# Patient Record
Sex: MALE | Race: WHITE | NOT HISPANIC OR LATINO
[De-identification: names, ages, dates, MRNs, and addresses within clinical notes are randomized per-mention and may not be internally consistent; named-entity substitution may affect disease eponyms.]

---

## 2017-08-18 ENCOUNTER — MEDICATION RENEWAL (OUTPATIENT)
Age: 67
End: 2017-08-18

## 2017-10-26 ENCOUNTER — APPOINTMENT (OUTPATIENT)
Dept: UROLOGY | Facility: CLINIC | Age: 67
End: 2017-10-26
Payer: MEDICARE

## 2017-10-26 LAB
APPEARANCE: CLEAR
BACTERIA: ABNORMAL
BILIRUBIN URINE: NEGATIVE
BLOOD URINE: NEGATIVE
COLOR: YELLOW
GLUCOSE QUALITATIVE U: NEGATIVE MG/DL
HYALINE CASTS: 3 /LPF
KETONES URINE: NEGATIVE
LEUKOCYTE ESTERASE URINE: ABNORMAL
MICROSCOPIC-UA: NORMAL
NITRITE URINE: POSITIVE
PH URINE: 5
PROTEIN URINE: NEGATIVE MG/DL
RED BLOOD CELLS URINE: 2 /HPF
SPECIFIC GRAVITY URINE: 1.02
SQUAMOUS EPITHELIAL CELLS: 2 /HPF
UROBILINOGEN URINE: NEGATIVE MG/DL
WHITE BLOOD CELLS URINE: 22 /HPF

## 2017-10-26 PROCEDURE — 99214 OFFICE O/P EST MOD 30 MIN: CPT

## 2017-10-27 LAB
CORE LAB FLUID CYTOLOGY: NORMAL
PSA FREE FLD-MCNC: 19.5
PSA FREE SERPL-MCNC: 0.69 NG/ML
PSA SERPL-MCNC: 3.54 NG/ML

## 2017-10-30 LAB — BACTERIA UR CULT: ABNORMAL

## 2017-10-30 RX ORDER — NITROFURANTOIN (MONOHYDRATE/MACROCRYSTALS) 25; 75 MG/1; MG/1
100 CAPSULE ORAL TWICE DAILY
Qty: 14 | Refills: 0 | Status: ACTIVE | COMMUNITY
Start: 2017-10-30 | End: 1900-01-01

## 2018-02-13 LAB
APPEARANCE: CLEAR
BACTERIA: NEGATIVE
BILIRUBIN URINE: NEGATIVE
BLOOD URINE: NEGATIVE
COLOR: YELLOW
GLUCOSE QUALITATIVE U: NEGATIVE MG/DL
KETONES URINE: NEGATIVE
LEUKOCYTE ESTERASE URINE: ABNORMAL
MICROSCOPIC-UA: NORMAL
NITRITE URINE: NEGATIVE
PH URINE: 6
PROTEIN URINE: NEGATIVE MG/DL
RED BLOOD CELLS URINE: 2 /HPF
SPECIFIC GRAVITY URINE: 1
SQUAMOUS EPITHELIAL CELLS: 1 /HPF
UROBILINOGEN URINE: NEGATIVE MG/DL
WHITE BLOOD CELLS URINE: 3 /HPF

## 2018-02-14 LAB — BACTERIA UR CULT: NORMAL

## 2018-06-12 RX ORDER — CEPHALEXIN 500 MG/1
500 CAPSULE ORAL
Qty: 30 | Refills: 3 | Status: ACTIVE | COMMUNITY
Start: 2018-06-12 | End: 1900-01-01

## 2018-07-27 ENCOUNTER — APPOINTMENT (OUTPATIENT)
Dept: CT IMAGING | Facility: IMAGING CENTER | Age: 68
End: 2018-07-27
Payer: MEDICARE

## 2018-07-27 ENCOUNTER — OUTPATIENT (OUTPATIENT)
Dept: OUTPATIENT SERVICES | Facility: HOSPITAL | Age: 68
LOS: 1 days | End: 2018-07-27
Payer: MEDICARE

## 2018-07-27 DIAGNOSIS — Z00.8 ENCOUNTER FOR OTHER GENERAL EXAMINATION: ICD-10-CM

## 2018-07-27 DIAGNOSIS — N41.1 CHRONIC PROSTATITIS: ICD-10-CM

## 2018-07-28 LAB
APPEARANCE: ABNORMAL
BACTERIA: ABNORMAL
BILIRUBIN URINE: NEGATIVE
BLOOD URINE: ABNORMAL
COLOR: YELLOW
GLUCOSE QUALITATIVE U: NEGATIVE MG/DL
HYALINE CASTS: 0 /LPF
KETONES URINE: NEGATIVE
LEUKOCYTE ESTERASE URINE: ABNORMAL
MICROSCOPIC-UA: NORMAL
NITRITE URINE: POSITIVE
PH URINE: 5.5
PROTEIN URINE: ABNORMAL MG/DL
RED BLOOD CELLS URINE: 3 /HPF
SPECIFIC GRAVITY URINE: 1.02
SQUAMOUS EPITHELIAL CELLS: 1 /HPF
UROBILINOGEN URINE: NEGATIVE MG/DL
WHITE BLOOD CELLS URINE: >720 /HPF

## 2018-07-30 LAB
BACTERIA UR CULT: ABNORMAL
CORE LAB FLUID CYTOLOGY: NORMAL

## 2018-07-30 PROCEDURE — 74178 CT ABD&PLV WO CNTR FLWD CNTR: CPT

## 2018-07-30 PROCEDURE — 82565 ASSAY OF CREATININE: CPT

## 2018-07-30 PROCEDURE — 74178 CT ABD&PLV WO CNTR FLWD CNTR: CPT | Mod: 26

## 2018-07-30 RX ORDER — LEVOFLOXACIN 500 MG/1
500 TABLET, FILM COATED ORAL
Qty: 14 | Refills: 0 | Status: ACTIVE | COMMUNITY
Start: 2018-07-30 | End: 1900-01-01

## 2018-08-23 ENCOUNTER — APPOINTMENT (OUTPATIENT)
Dept: UROLOGY | Facility: CLINIC | Age: 68
End: 2018-08-23
Payer: MEDICARE

## 2018-08-23 DIAGNOSIS — R97.20 ELEVATED PROSTATE, SPECIFIC ANTIGEN [PSA]: ICD-10-CM

## 2018-08-23 PROCEDURE — 51798 US URINE CAPACITY MEASURE: CPT

## 2018-08-23 PROCEDURE — 99214 OFFICE O/P EST MOD 30 MIN: CPT | Mod: 25

## 2018-08-24 LAB
APPEARANCE: ABNORMAL
BACTERIA: ABNORMAL
BILIRUBIN URINE: NEGATIVE
BLOOD URINE: ABNORMAL
COLOR: YELLOW
GLUCOSE QUALITATIVE U: NEGATIVE MG/DL
HYALINE CASTS: 0 /LPF
KETONES URINE: NEGATIVE
LEUKOCYTE ESTERASE URINE: ABNORMAL
MICROSCOPIC-UA: NORMAL
NITRITE URINE: NEGATIVE
PH URINE: 5.5
PROTEIN URINE: NEGATIVE MG/DL
PSA FREE FLD-MCNC: 22.7
PSA FREE SERPL-MCNC: 0.92 NG/ML
PSA SERPL-MCNC: 4.06 NG/ML
RED BLOOD CELLS URINE: 2 /HPF
SPECIFIC GRAVITY URINE: 1.02
SQUAMOUS EPITHELIAL CELLS: 1 /HPF
UROBILINOGEN URINE: NEGATIVE MG/DL
WHITE BLOOD CELLS URINE: 176 /HPF

## 2018-08-27 LAB
BACTERIA UR CULT: ABNORMAL
CORE LAB FLUID CYTOLOGY: NORMAL

## 2018-09-03 PROBLEM — R97.20 ELEVATED PROSTATE SPECIFIC ANTIGEN (PSA): Status: ACTIVE | Noted: 2018-08-23

## 2018-10-25 ENCOUNTER — APPOINTMENT (OUTPATIENT)
Dept: UROLOGY | Facility: CLINIC | Age: 68
End: 2018-10-25

## 2019-04-16 ENCOUNTER — APPOINTMENT (OUTPATIENT)
Dept: UROLOGY | Facility: CLINIC | Age: 69
End: 2019-04-16
Payer: MEDICARE

## 2019-04-16 DIAGNOSIS — R97.20 ELEVATED PROSTATE, SPECIFIC ANTIGEN [PSA]: ICD-10-CM

## 2019-04-16 PROCEDURE — 51798 US URINE CAPACITY MEASURE: CPT

## 2019-04-16 PROCEDURE — 99214 OFFICE O/P EST MOD 30 MIN: CPT | Mod: 25

## 2019-04-16 PROCEDURE — 88112 CYTOPATH CELL ENHANCE TECH: CPT | Mod: 26

## 2019-04-16 NOTE — PHYSICAL EXAM
[General Appearance - Well Developed] : well developed [General Appearance - Well Nourished] : well nourished [General Appearance - In No Acute Distress] : no acute distress [Normal Appearance] : normal appearance [Well Groomed] : well groomed [Abdomen Tenderness] : non-tender [Abdomen Soft] : soft [Costovertebral Angle Tenderness] : no ~M costovertebral angle tenderness [Scrotum] : the scrotum was normal [Urinary Bladder Findings] : the bladder was normal on palpation [Urethral Meatus] : meatus normal [Testes Mass (___cm)] : there were no testicular masses [No Prostate Nodules] : no prostate nodules [Respiration, Rhythm And Depth] : normal respiratory rhythm and effort [] : no respiratory distress [Edema] : no peripheral edema [Oriented To Time, Place, And Person] : oriented to person, place, and time [Affect] : the affect was normal [Exaggerated Use Of Accessory Muscles For Inspiration] : no accessory muscle use [Mood] : the mood was normal [Not Anxious] : not anxious [No Palpable Adenopathy] : no palpable adenopathy [No Focal Deficits] : no focal deficits [Normal Station and Gait] : the gait and station were normal for the patient's age

## 2019-04-17 LAB
APPEARANCE: ABNORMAL
BACTERIA: ABNORMAL
BILIRUBIN URINE: NEGATIVE
BLOOD URINE: NEGATIVE
COLOR: YELLOW
GLUCOSE QUALITATIVE U: NEGATIVE
HYALINE CASTS: 2 /LPF
KETONES URINE: NEGATIVE
LEUKOCYTE ESTERASE URINE: ABNORMAL
MICROSCOPIC-UA: NORMAL
NITRITE URINE: POSITIVE
PH URINE: 6.5
PROTEIN URINE: NORMAL
PSA FREE FLD-MCNC: 19 %
PSA FREE SERPL-MCNC: 0.65 NG/ML
PSA SERPL-MCNC: 3.34 NG/ML
RED BLOOD CELLS URINE: 1 /HPF
SPECIFIC GRAVITY URINE: 1.02
SQUAMOUS EPITHELIAL CELLS: 3 /HPF
URINE CYTOLOGY: NORMAL
UROBILINOGEN URINE: NORMAL
WHITE BLOOD CELLS URINE: 195 /HPF

## 2019-04-18 LAB — BACTERIA UR CULT: ABNORMAL

## 2019-08-27 ENCOUNTER — MEDICATION RENEWAL (OUTPATIENT)
Age: 69
End: 2019-08-27

## 2019-09-05 ENCOUNTER — APPOINTMENT (OUTPATIENT)
Dept: UROLOGY | Facility: CLINIC | Age: 69
End: 2019-09-05

## 2019-11-05 ENCOUNTER — APPOINTMENT (OUTPATIENT)
Dept: UROLOGY | Facility: CLINIC | Age: 69
End: 2019-11-05

## 2020-05-27 ENCOUNTER — APPOINTMENT (OUTPATIENT)
Dept: UROLOGY | Facility: CLINIC | Age: 70
End: 2020-05-27
Payer: MEDICARE

## 2020-05-27 LAB
PSA FREE FLD-MCNC: 21 %
PSA FREE SERPL-MCNC: 0.81 NG/ML
PSA SERPL-MCNC: 3.75 NG/ML

## 2020-05-27 PROCEDURE — 99213 OFFICE O/P EST LOW 20 MIN: CPT

## 2020-05-28 LAB
APPEARANCE: ABNORMAL
BACTERIA: ABNORMAL
BILIRUBIN URINE: NEGATIVE
BLOOD URINE: NEGATIVE
COLOR: YELLOW
GLUCOSE QUALITATIVE U: NEGATIVE
HYALINE CASTS: 1 /LPF
KETONES URINE: NEGATIVE
LEUKOCYTE ESTERASE URINE: ABNORMAL
MICROSCOPIC-UA: NORMAL
NITRITE URINE: POSITIVE
PH URINE: 6.5
PROTEIN URINE: NORMAL
RED BLOOD CELLS URINE: 1 /HPF
SPECIFIC GRAVITY URINE: 1.03
SQUAMOUS EPITHELIAL CELLS: 4 /HPF
UROBILINOGEN URINE: NORMAL
WHITE BLOOD CELLS URINE: 13 /HPF

## 2020-05-30 LAB — BACTERIA UR CULT: ABNORMAL

## 2020-09-11 RX ORDER — ALFUZOSIN HYDROCHLORIDE 10 MG/1
10 TABLET, EXTENDED RELEASE ORAL
Qty: 90 | Refills: 3 | Status: ACTIVE | COMMUNITY
Start: 2020-09-11 | End: 1900-01-01

## 2021-05-20 ENCOUNTER — APPOINTMENT (OUTPATIENT)
Dept: UROLOGY | Facility: CLINIC | Age: 71
End: 2021-05-20
Payer: MEDICARE

## 2021-05-20 LAB
APPEARANCE: CLEAR
BACTERIA: ABNORMAL
BILIRUBIN URINE: NEGATIVE
BLOOD URINE: NORMAL
COLOR: YELLOW
GLUCOSE QUALITATIVE U: NEGATIVE
HYALINE CASTS: 2 /LPF
KETONES URINE: NEGATIVE
LEUKOCYTE ESTERASE URINE: ABNORMAL
MICROSCOPIC-UA: NORMAL
NITRITE URINE: POSITIVE
PH URINE: 5.5
PROTEIN URINE: NORMAL
RED BLOOD CELLS URINE: 12 /HPF
SPECIFIC GRAVITY URINE: 1.02
SQUAMOUS EPITHELIAL CELLS: 2 /HPF
UROBILINOGEN URINE: NORMAL
WHITE BLOOD CELLS URINE: 38 /HPF

## 2021-05-20 PROCEDURE — 99214 OFFICE O/P EST MOD 30 MIN: CPT

## 2021-05-21 LAB
PSA FREE FLD-MCNC: 25 %
PSA FREE SERPL-MCNC: 0.75 NG/ML
PSA SERPL-MCNC: 2.93 NG/ML

## 2021-05-23 LAB — BACTERIA UR CULT: ABNORMAL

## 2022-05-19 ENCOUNTER — APPOINTMENT (OUTPATIENT)
Dept: UROLOGY | Facility: CLINIC | Age: 72
End: 2022-05-19
Payer: MEDICARE

## 2022-05-19 DIAGNOSIS — N41.9 INFLAMMATORY DISEASE OF PROSTATE, UNSPECIFIED: ICD-10-CM

## 2022-05-19 LAB
PSA FREE FLD-MCNC: 20 %
PSA FREE SERPL-MCNC: 0.92 NG/ML
PSA SERPL-MCNC: 4.54 NG/ML

## 2022-05-19 PROCEDURE — 99214 OFFICE O/P EST MOD 30 MIN: CPT

## 2022-05-20 LAB
APPEARANCE: ABNORMAL
BACTERIA: ABNORMAL
BILIRUBIN URINE: NEGATIVE
BLOOD URINE: NEGATIVE
COLOR: YELLOW
GLUCOSE QUALITATIVE U: NEGATIVE
HYALINE CASTS: 4 /LPF
KETONES URINE: NEGATIVE
LEUKOCYTE ESTERASE URINE: ABNORMAL
MICROSCOPIC-UA: NORMAL
NITRITE URINE: POSITIVE
PH URINE: 6
PROTEIN URINE: NORMAL
RED BLOOD CELLS URINE: 5 /HPF
SPECIFIC GRAVITY URINE: 1.02
SQUAMOUS EPITHELIAL CELLS: 2 /HPF
UROBILINOGEN URINE: NORMAL
WHITE BLOOD CELLS URINE: 45 /HPF

## 2022-05-21 LAB — BACTERIA UR CULT: NORMAL

## 2023-05-18 ENCOUNTER — APPOINTMENT (OUTPATIENT)
Dept: UROLOGY | Facility: CLINIC | Age: 73
End: 2023-05-18
Payer: MEDICARE

## 2023-05-18 VITALS
RESPIRATION RATE: 17 BRPM | SYSTOLIC BLOOD PRESSURE: 147 MMHG | BODY MASS INDEX: 24.25 KG/M2 | HEIGHT: 68 IN | DIASTOLIC BLOOD PRESSURE: 78 MMHG | HEART RATE: 61 BPM | WEIGHT: 160 LBS | OXYGEN SATURATION: 97 %

## 2023-05-18 DIAGNOSIS — R35.0 FREQUENCY OF MICTURITION: ICD-10-CM

## 2023-05-18 PROCEDURE — 99214 OFFICE O/P EST MOD 30 MIN: CPT

## 2023-05-18 PROCEDURE — 88112 CYTOPATH CELL ENHANCE TECH: CPT | Mod: 26

## 2023-05-19 LAB
APPEARANCE: ABNORMAL
BACTERIA: ABNORMAL /HPF
BILIRUBIN URINE: NEGATIVE
BLOOD URINE: NEGATIVE
CALCIUM OXALATE CRYSTALS: PRESENT
CAST: 0 /LPF
COLOR: YELLOW
EPITHELIAL CELLS: 15 /HPF
GLUCOSE QUALITATIVE U: NEGATIVE MG/DL
KETONES URINE: NEGATIVE MG/DL
LEUKOCYTE ESTERASE URINE: ABNORMAL
MICROSCOPIC-UA: NORMAL
NITRITE URINE: POSITIVE
PH URINE: 5.5
PROTEIN URINE: NEGATIVE MG/DL
PSA FREE FLD-MCNC: 17 %
PSA FREE SERPL-MCNC: 1.03 NG/ML
PSA SERPL-MCNC: 6.22 NG/ML
RED BLOOD CELLS URINE: 3 /HPF
REVIEW: NORMAL
SPECIFIC GRAVITY URINE: 1.02
UROBILINOGEN URINE: 0.2 MG/DL
WHITE BLOOD CELLS URINE: 74 /HPF

## 2023-05-19 RX ORDER — CEFUROXIME AXETIL 500 MG/1
500 TABLET ORAL
Qty: 20 | Refills: 0 | Status: ACTIVE | COMMUNITY
Start: 2023-05-19 | End: 1900-01-01

## 2023-05-21 LAB — BACTERIA UR CULT: ABNORMAL

## 2023-05-22 LAB — URINE CYTOLOGY: NORMAL

## 2023-06-02 LAB
PSA FREE FLD-MCNC: 19 %
PSA FREE SERPL-MCNC: 0.75 NG/ML
PSA SERPL-MCNC: 3.98 NG/ML

## 2023-06-04 ENCOUNTER — TRANSCRIPTION ENCOUNTER (OUTPATIENT)
Age: 73
End: 2023-06-04

## 2023-06-08 RX ORDER — ALFUZOSIN HYDROCHLORIDE 10 MG/1
10 TABLET, EXTENDED RELEASE ORAL
Qty: 90 | Refills: 3 | Status: ACTIVE | COMMUNITY
Start: 2021-09-14 | End: 1900-01-01

## 2024-05-23 ENCOUNTER — APPOINTMENT (OUTPATIENT)
Dept: UROLOGY | Facility: CLINIC | Age: 74
End: 2024-05-23
Payer: MEDICARE

## 2024-05-23 DIAGNOSIS — N41.1 CHRONIC PROSTATITIS: ICD-10-CM

## 2024-05-23 DIAGNOSIS — N40.1 BENIGN PROSTATIC HYPERPLASIA WITH LOWER URINARY TRACT SYMPMS: ICD-10-CM

## 2024-05-23 DIAGNOSIS — N13.8 BENIGN PROSTATIC HYPERPLASIA WITH LOWER URINARY TRACT SYMPMS: ICD-10-CM

## 2024-05-23 PROCEDURE — 99214 OFFICE O/P EST MOD 30 MIN: CPT

## 2024-05-23 PROCEDURE — G2211 COMPLEX E/M VISIT ADD ON: CPT

## 2024-05-24 LAB
APPEARANCE: CLEAR
BACTERIA: ABNORMAL /HPF
BILIRUBIN URINE: NEGATIVE
BLOOD URINE: NEGATIVE
CAST: 0 /LPF
COLOR: YELLOW
EPITHELIAL CELLS: 3 /HPF
GLUCOSE QUALITATIVE U: NEGATIVE MG/DL
KETONES URINE: NEGATIVE MG/DL
LEUKOCYTE ESTERASE URINE: ABNORMAL
MICROSCOPIC-UA: NORMAL
NITRITE URINE: POSITIVE
PH URINE: 6.5
PROTEIN URINE: NEGATIVE MG/DL
PSA FREE FLD-MCNC: 19 %
PSA FREE SERPL-MCNC: 0.92 NG/ML
PSA SERPL-MCNC: 4.84 NG/ML
RED BLOOD CELLS URINE: 0 /HPF
SPECIFIC GRAVITY URINE: 1.02
URINE CYTOLOGY: NORMAL
UROBILINOGEN URINE: 0.2 MG/DL
WHITE BLOOD CELLS URINE: 9 /HPF

## 2024-05-27 LAB — BACTERIA UR CULT: ABNORMAL

## 2025-03-03 ENCOUNTER — OFFICE (OUTPATIENT)
Dept: URBAN - METROPOLITAN AREA CLINIC 12 | Facility: CLINIC | Age: 75
Setting detail: OPHTHALMOLOGY
End: 2025-03-03
Payer: MEDICARE

## 2025-03-03 DIAGNOSIS — H25.12: ICD-10-CM

## 2025-03-03 DIAGNOSIS — H35.363: ICD-10-CM

## 2025-03-03 DIAGNOSIS — H25.13: ICD-10-CM

## 2025-03-03 DIAGNOSIS — H25.11: ICD-10-CM

## 2025-03-03 DIAGNOSIS — H35.413: ICD-10-CM

## 2025-03-03 PROCEDURE — 99204 OFFICE O/P NEW MOD 45 MIN: CPT | Performed by: OPHTHALMOLOGY

## 2025-03-03 PROCEDURE — 92250 FUNDUS PHOTOGRAPHY W/I&R: CPT | Performed by: OPHTHALMOLOGY

## 2025-03-03 ASSESSMENT — KERATOMETRY
OD_K1POWER_DIOPTERS: 42.50
OS_AXISANGLE_DEGREES: 134
OD_K2POWER_DIOPTERS: 43.50
OS_K2POWER_DIOPTERS: 43.50
METHOD_AUTO_MANUAL: AUTO
OS_K1POWER_DIOPTERS: 43.00
OD_AXISANGLE_DEGREES: 076

## 2025-03-03 ASSESSMENT — REFRACTION_CURRENTRX
OD_OVR_VA: 20/
OS_OVR_VA: 20/
OS_SPHERE: -8.25
OD_CYLINDER: -1.50
OS_AXIS: 085
OD_AXIS: 178
OS_VPRISM_DIRECTION: PROGS
OD_ADD: +2.25
OD_SPHERE: -6.25
OS_CYLINDER: -0.50
OD_VPRISM_DIRECTION: PROGS
OS_ADD: +2.25

## 2025-03-03 ASSESSMENT — REFRACTION_MANIFEST
OS_AXIS: 100
OS_VA1: 20/30-1
OD_SPHERE: -8.00
OD_VA1: 20/40-2
OS_CYLINDER: -1.00
OS_SPHERE: -8.50
OD_AXIS: 150
OD_CYLINDER: -1.25

## 2025-03-03 ASSESSMENT — TONOMETRY
OS_IOP_MMHG: 15
OD_IOP_MMHG: 13

## 2025-03-03 ASSESSMENT — CONFRONTATIONAL VISUAL FIELD TEST (CVF)
OS_FINDINGS: FULL
OD_FINDINGS: FULL

## 2025-03-03 ASSESSMENT — REFRACTION_AUTOREFRACTION
OS_AXIS: 097
OD_AXIS: 153
OD_CYLINDER: -1.25
OS_SPHERE: -8.50
OS_CYLINDER: -1.00
OD_SPHERE: -8.00

## 2025-03-03 ASSESSMENT — VISUAL ACUITY
OS_BCVA: 20/60
OD_BCVA: 20/40

## 2025-03-24 ENCOUNTER — OFFICE (OUTPATIENT)
Dept: URBAN - METROPOLITAN AREA CLINIC 12 | Facility: CLINIC | Age: 75
Setting detail: OPHTHALMOLOGY
End: 2025-03-24
Payer: MEDICARE

## 2025-03-24 DIAGNOSIS — H25.13: ICD-10-CM

## 2025-03-24 DIAGNOSIS — H25.11: ICD-10-CM

## 2025-03-24 PROBLEM — H35.363 DRUSEN; BOTH EYES: Status: ACTIVE | Noted: 2025-03-03

## 2025-03-24 PROBLEM — H25.12 CATARACT SENILE NUCLEAR SCLEROSIS; RIGHT EYE, LEFT EYE, BOTH EYES: Status: ACTIVE | Noted: 2025-03-03

## 2025-03-24 PROBLEM — H35.413 LATTICE DEGENERATION; BOTH EYES: Status: ACTIVE | Noted: 2025-03-03

## 2025-03-24 PROCEDURE — 92136 OPHTHALMIC BIOMETRY: CPT | Mod: 26,RT | Performed by: OPHTHALMOLOGY

## 2025-03-24 PROCEDURE — 92136 OPHTHALMIC BIOMETRY: CPT | Mod: TC | Performed by: OPHTHALMOLOGY

## 2025-03-24 PROCEDURE — 99213 OFFICE O/P EST LOW 20 MIN: CPT | Performed by: OPHTHALMOLOGY

## 2025-03-24 ASSESSMENT — KERATOMETRY
OD_K1POWER_DIOPTERS: 42.75
OD_AXISANGLE_DEGREES: 075
METHOD_AUTO_MANUAL: AUTO
OS_K2POWER_DIOPTERS: 43.50
OS_AXISANGLE_DEGREES: 133
OS_K1POWER_DIOPTERS: 42.75
OD_K2POWER_DIOPTERS: 43.50

## 2025-03-24 ASSESSMENT — REFRACTION_MANIFEST
OS_SPHERE: -8.50
OD_AXIS: 150
OS_CYLINDER: -1.00
OD_CYLINDER: -1.25
OS_AXIS: 100
OD_SPHERE: -8.00
OD_VA1: 20/40-2
OS_VA1: 20/30-1

## 2025-03-24 ASSESSMENT — REFRACTION_CURRENTRX
OS_OVR_VA: 20/
OD_CYLINDER: -1.50
OS_VPRISM_DIRECTION: PROGS
OD_OVR_VA: 20/
OD_AXIS: 178
OS_CYLINDER: -0.50
OS_AXIS: 085
OS_ADD: +2.25
OD_ADD: +2.25
OD_VPRISM_DIRECTION: PROGS
OS_SPHERE: -8.25
OD_SPHERE: -6.25

## 2025-03-24 ASSESSMENT — REFRACTION_AUTOREFRACTION
OD_CYLINDER: -1.25
OS_CYLINDER: -1.00
OD_AXIS: 158
OD_SPHERE: -8.25
OS_SPHERE: -8.75
OS_AXIS: 090

## 2025-03-24 ASSESSMENT — VISUAL ACUITY
OS_BCVA: 20/60-2
OD_BCVA: 20/50-

## 2025-03-24 ASSESSMENT — CONFRONTATIONAL VISUAL FIELD TEST (CVF)
OS_FINDINGS: FULL
OD_FINDINGS: FULL

## 2025-03-24 ASSESSMENT — TONOMETRY
OS_IOP_MMHG: 15
OD_IOP_MMHG: 16

## 2025-04-08 ENCOUNTER — ASC (OUTPATIENT)
Dept: URBAN - METROPOLITAN AREA SURGERY 8 | Facility: SURGERY | Age: 75
Setting detail: OPHTHALMOLOGY
End: 2025-04-08
Payer: MEDICARE

## 2025-04-08 DIAGNOSIS — H52.221: ICD-10-CM

## 2025-04-08 DIAGNOSIS — H25.11: ICD-10-CM

## 2025-04-08 PROCEDURE — 68841 INSJ RX ELUT IMPLT LAC CANAL: CPT | Mod: RT | Performed by: OPHTHALMOLOGY

## 2025-04-08 PROCEDURE — FEMTO PRECISION LASER CATARACT SURGERY: Mod: GY | Performed by: OPHTHALMOLOGY

## 2025-04-08 PROCEDURE — 66984 XCAPSL CTRC RMVL W/O ECP: CPT | Mod: 54,RT | Performed by: OPHTHALMOLOGY

## 2025-04-08 PROCEDURE — S9986 NOT MEDICALLY NECESSARY SVC: HCPCS | Mod: GX,GY | Performed by: OPHTHALMOLOGY

## 2025-04-09 ENCOUNTER — RX ONLY (RX ONLY)
Age: 75
End: 2025-04-09

## 2025-04-09 ENCOUNTER — OFFICE (OUTPATIENT)
Dept: URBAN - METROPOLITAN AREA CLINIC 12 | Facility: CLINIC | Age: 75
Setting detail: OPHTHALMOLOGY
End: 2025-04-09
Payer: MEDICARE

## 2025-04-09 DIAGNOSIS — H25.12: ICD-10-CM

## 2025-04-09 PROCEDURE — 92136 OPHTHALMIC BIOMETRY: CPT | Mod: 26,LT | Performed by: OPHTHALMOLOGY

## 2025-04-09 ASSESSMENT — KERATOMETRY
METHOD_AUTO_MANUAL: AUTO
OD_AXISANGLE_DEGREES: 132
OS_K1POWER_DIOPTERS: 43.25
OD_K2POWER_DIOPTERS: 44.00
OD_K1POWER_DIOPTERS: 43.50
OS_AXISANGLE_DEGREES: 100
OS_K2POWER_DIOPTERS: 43.75

## 2025-04-09 ASSESSMENT — REFRACTION_CURRENTRX
OD_VPRISM_DIRECTION: PROGS
OS_VPRISM_DIRECTION: PROGS
OD_CYLINDER: -1.50
OD_OVR_VA: 20/
OS_CYLINDER: -0.50
OS_OVR_VA: 20/
OS_AXIS: 085
OD_AXIS: 178
OS_ADD: +2.25
OS_SPHERE: -8.25
OD_SPHERE: -6.25
OD_ADD: +2.25

## 2025-04-09 ASSESSMENT — REFRACTION_MANIFEST
OS_SPHERE: -8.50
OD_CYLINDER: -1.25
OS_VA1: 20/30-1
OD_AXIS: 150
OS_CYLINDER: -1.00
OS_AXIS: 100
OD_VA1: 20/40-2
OD_SPHERE: -8.00

## 2025-04-09 ASSESSMENT — REFRACTION_AUTOREFRACTION
OS_CYLINDER: -0.75
OD_AXIS: 063
OS_SPHERE: -8.50
OD_SPHERE: -0.50
OD_CYLINDER: -0.50
OS_AXIS: 100

## 2025-04-09 ASSESSMENT — VISUAL ACUITY
OD_BCVA: 20/30-2
OS_BCVA: 20/25-2

## 2025-04-09 ASSESSMENT — CONFRONTATIONAL VISUAL FIELD TEST (CVF)
OS_FINDINGS: FULL
OD_FINDINGS: FULL

## 2025-04-09 ASSESSMENT — TONOMETRY: OS_IOP_MMHG: 13

## 2025-04-22 ENCOUNTER — ASC (OUTPATIENT)
Dept: URBAN - METROPOLITAN AREA SURGERY 8 | Facility: SURGERY | Age: 75
Setting detail: OPHTHALMOLOGY
End: 2025-04-22
Payer: MEDICARE

## 2025-04-22 DIAGNOSIS — H25.12: ICD-10-CM

## 2025-04-22 DIAGNOSIS — H52.222: ICD-10-CM

## 2025-04-22 PROCEDURE — 66984 XCAPSL CTRC RMVL W/O ECP: CPT | Mod: 54,79,LT | Performed by: OPHTHALMOLOGY

## 2025-04-22 PROCEDURE — S9986 NOT MEDICALLY NECESSARY SVC: HCPCS | Mod: GX,GY | Performed by: OPHTHALMOLOGY

## 2025-04-22 PROCEDURE — 68841 INSJ RX ELUT IMPLT LAC CANAL: CPT | Mod: 79,LT | Performed by: OPHTHALMOLOGY

## 2025-04-22 PROCEDURE — FEMTO PRECISION LASER CATARACT SURGERY: Mod: GY | Performed by: OPHTHALMOLOGY

## 2025-04-23 ENCOUNTER — RX ONLY (RX ONLY)
Age: 75
End: 2025-04-23

## 2025-04-23 ENCOUNTER — OFFICE (OUTPATIENT)
Dept: URBAN - METROPOLITAN AREA CLINIC 12 | Facility: CLINIC | Age: 75
Setting detail: OPHTHALMOLOGY
End: 2025-04-23
Payer: MEDICARE

## 2025-04-23 DIAGNOSIS — Z96.1: ICD-10-CM

## 2025-04-23 PROCEDURE — 99024 POSTOP FOLLOW-UP VISIT: CPT | Performed by: OPHTHALMOLOGY

## 2025-04-23 ASSESSMENT — CONFRONTATIONAL VISUAL FIELD TEST (CVF)
OD_FINDINGS: FULL
OS_FINDINGS: FULL

## 2025-04-23 ASSESSMENT — REFRACTION_MANIFEST
OD_CYLINDER: -1.25
OD_AXIS: 150
OS_CYLINDER: -1.00
OD_SPHERE: -8.00
OS_SPHERE: -8.50
OS_VA1: 20/30-1
OS_AXIS: 100
OD_VA1: 20/40-2

## 2025-04-23 ASSESSMENT — REFRACTION_AUTOREFRACTION
OD_SPHERE: +0.25
OD_CYLINDER: -0.75
OS_SPHERE: PLANO
OS_AXIS: 078
OS_CYLINDER: -0.25
OD_AXIS: 153

## 2025-04-23 ASSESSMENT — KERATOMETRY
OS_K2POWER_DIOPTERS: 43.75
OS_AXISANGLE_DEGREES: 113
OS_K1POWER_DIOPTERS: 43.25
METHOD_AUTO_MANUAL: AUTO
OD_K2POWER_DIOPTERS: 44.00
OD_AXISANGLE_DEGREES: 085
OD_K1POWER_DIOPTERS: 43.00

## 2025-04-23 ASSESSMENT — VISUAL ACUITY
OS_BCVA: 20/20
OD_BCVA: 20/20-2

## 2025-04-23 ASSESSMENT — REFRACTION_CURRENTRX
OS_VPRISM_DIRECTION: PROGS
OD_SPHERE: -6.25
OD_OVR_VA: 20/
OS_SPHERE: -8.25
OD_CYLINDER: -1.50
OD_AXIS: 178
OS_CYLINDER: -0.50
OS_OVR_VA: 20/
OS_ADD: +2.25
OS_AXIS: 085
OD_ADD: +2.25
OD_VPRISM_DIRECTION: PROGS

## 2025-04-23 ASSESSMENT — TONOMETRY
OD_IOP_MMHG: 13
OS_IOP_MMHG: 18

## 2025-05-29 ENCOUNTER — NON-APPOINTMENT (OUTPATIENT)
Age: 75
End: 2025-05-29

## 2025-05-29 ENCOUNTER — APPOINTMENT (OUTPATIENT)
Dept: UROLOGY | Facility: CLINIC | Age: 75
End: 2025-05-29
Payer: MEDICARE

## 2025-05-29 DIAGNOSIS — N40.1 BENIGN PROSTATIC HYPERPLASIA WITH LOWER URINARY TRACT SYMPMS: ICD-10-CM

## 2025-05-29 DIAGNOSIS — N13.8 BENIGN PROSTATIC HYPERPLASIA WITH LOWER URINARY TRACT SYMPMS: ICD-10-CM

## 2025-05-29 PROCEDURE — 99214 OFFICE O/P EST MOD 30 MIN: CPT

## 2025-05-29 PROCEDURE — G2211 COMPLEX E/M VISIT ADD ON: CPT

## 2025-05-30 LAB
APPEARANCE: ABNORMAL
BACTERIA: ABNORMAL /HPF
BILIRUBIN URINE: NEGATIVE
BLOOD URINE: NEGATIVE
CAST: 1 /LPF
COLOR: YELLOW
EPITHELIAL CELLS: 2 /HPF
GLUCOSE QUALITATIVE U: NEGATIVE MG/DL
KETONES URINE: NEGATIVE MG/DL
LEUKOCYTE ESTERASE URINE: ABNORMAL
MICROSCOPIC-UA: NORMAL
NITRITE URINE: POSITIVE
PH URINE: 6
PROTEIN URINE: NEGATIVE MG/DL
PSA FREE FLD-MCNC: 22 %
PSA FREE SERPL-MCNC: 1.11 NG/ML
PSA SERPL-MCNC: 4.94 NG/ML
RED BLOOD CELLS URINE: 0 /HPF
SPECIFIC GRAVITY URINE: 1.02
UROBILINOGEN URINE: 0.2 MG/DL
WHITE BLOOD CELLS URINE: 44 /HPF

## 2025-05-31 LAB — URINE CYTOLOGY: NORMAL

## 2025-06-03 LAB — BACTERIA UR CULT: ABNORMAL

## 2025-09-05 ENCOUNTER — RX RENEWAL (OUTPATIENT)
Age: 75
End: 2025-09-05